# Patient Record
Sex: FEMALE | Race: WHITE | URBAN - METROPOLITAN AREA
[De-identification: names, ages, dates, MRNs, and addresses within clinical notes are randomized per-mention and may not be internally consistent; named-entity substitution may affect disease eponyms.]

---

## 2022-10-21 ENCOUNTER — OFFICE VISIT (OUTPATIENT)
Dept: ENT CLINIC | Age: 62
End: 2022-10-21
Payer: COMMERCIAL

## 2022-10-21 VITALS — BODY MASS INDEX: 30.21 KG/M2 | RESPIRATION RATE: 16 BRPM | HEIGHT: 61 IN | TEMPERATURE: 97.8 F | WEIGHT: 160 LBS

## 2022-10-21 DIAGNOSIS — E04.2 MULTINODULAR GOITER: Primary | ICD-10-CM

## 2022-10-21 PROCEDURE — 99204 OFFICE O/P NEW MOD 45 MIN: CPT | Performed by: OTOLARYNGOLOGY

## 2022-10-21 ASSESSMENT — ENCOUNTER SYMPTOMS
SHORTNESS OF BREATH: 0
SORE THROAT: 0
COUGH: 0
SINUS PRESSURE: 0
TROUBLE SWALLOWING: 0
VOICE CHANGE: 0
FACIAL SWELLING: 0
EYE ITCHING: 0
APNEA: 0

## 2022-10-21 NOTE — PROGRESS NOTES
[facility], [address], [address] [place] [address]  P: [phone]       Patient     Joellen Gosselin  1960    ChiefComplaint     Chief Complaint   Patient presents with    New Patient     Patient here today for thyroid, brought in disc with imaging       History of Present Illness     Ольга Palacio is a 68-year-old female here today for evaluation of right thyroid enlargement. States 20 years had a left thyroid goiter removed. Has not been on thyroid supplementation since then. Last several months noticed enlarging right neck fullness. Had ultrasound performed which demonstrated a right thyroid goiter with 3 large nodules. Mom has history of thyroid cancer, type unknown. No history of radiation exposure. Denies dysphagia, odynophagia    Past Medical History     Past Medical History:   Diagnosis Date    Nosebleed        Past Surgical History     Past Surgical History:   Procedure Laterality Date    TONSILLECTOMY         Family History     History reviewed. No pertinent family history. Social History     Social History     Tobacco Use    Smoking status: Every Day     Packs/day: 0.50     Years: 10.00     Pack years: 5.00     Types: Cigarettes    Smokeless tobacco: Never   Vaping Use    Vaping Use: Never used   Substance Use Topics    Alcohol use: Never    Drug use: Never        Allergies     Allergies   Allergen Reactions    Penicillins Other (See Comments)     States that she gets an awful infection       Medications     No current outpatient medications on file. No current facility-administered medications for this visit. Review of Systems     Review of Systems   Constitutional:  Negative for appetite change, chills, fatigue, fever and unexpected weight change. HENT:  Negative for congestion, ear discharge, ear pain, facial swelling, hearing loss, nosebleeds, postnasal drip, sinus pressure, sneezing, sore throat, tinnitus, trouble swallowing and voice change.     Eyes: Negative for itching. Respiratory:  Negative for apnea, cough and shortness of breath. Endocrine: Negative for cold intolerance and heat intolerance. Musculoskeletal:  Negative for myalgias and neck pain. Skin:  Negative for rash. Allergic/Immunologic: Negative for environmental allergies. Neurological:  Negative for dizziness and headaches. Psychiatric/Behavioral:  Negative for confusion, decreased concentration and sleep disturbance. PhysicalExam     Vitals:    10/21/22 1440   Resp: 16   Temp: 97.8 °F (36.6 °C)   TempSrc: Infrared   Weight: 160 lb (72.6 kg)   Height: 5' 1\" (1.549 m)       Physical Exam  Constitutional:       General: She is not in acute distress. Appearance: She is well-developed. HENT:      Head: Normocephalic and atraumatic. Right Ear: Tympanic membrane, ear canal and external ear normal. No drainage. No middle ear effusion. Tympanic membrane is not bulging. Tympanic membrane has normal mobility. Left Ear: Tympanic membrane, ear canal and external ear normal. No drainage. No middle ear effusion. Tympanic membrane is not bulging. Tympanic membrane has normal mobility. Nose: No mucosal edema or rhinorrhea. Mouth/Throat:      Lips: Pink. Mouth: Mucous membranes are moist.      Tongue: No lesions. Palate: No mass. Pharynx: Uvula midline. Eyes:      Pupils: Pupils are equal, round, and reactive to light. Neck:      Thyroid: Thyromegaly (right) present. No thyroid mass. Trachea: Trachea and phonation normal.   Cardiovascular:      Pulses: Normal pulses. Pulmonary:      Effort: Pulmonary effort is normal. No accessory muscle usage or respiratory distress. Breath sounds: No stridor. Musculoskeletal:      Cervical back: Full passive range of motion without pain. Lymphadenopathy:      Head:      Right side of head: No submental or submandibular adenopathy. Left side of head: No submental or submandibular adenopathy. Cervical: No cervical adenopathy. Right cervical: No superficial, deep or posterior cervical adenopathy. Left cervical: No superficial, deep or posterior cervical adenopathy. Skin:     General: Skin is warm and dry. Neurological:      Mental Status: She is alert and oriented to person, place, and time. Cranial Nerves: No cranial nerve deficit. Coordination: Coordination normal.      Gait: Gait normal.   Psychiatric:         Thought Content: Thought content normal.         Procedure     Flexible Laryngoscopy    Pre op: thyroid goiter  Post op: same  Procedure : Flexible Laryngoscopy  Surgeon: Alley Jarrell DO  Anesthesia: Afrin with 4% lidocaine  Indication: 75-year-old female with history of hemithyroidectomy need to evaluate vocal cord mobility laryngeal mirror examination was not tolerated due to gag reflex  Description:  The scope was passed along the floor of the right naris to the level of the larynx. The base of tongue, vallecula, epiglottis, aryepiglottic folds, arytenoids, false vocal folds, true vocal folds, or pyriform sinuses were all evaluated. True vocal folds exhibited symmetric motion bilaterally without evidence of paralysis or paresis. The scope was removed. The patient tolerated the procedure without difficulty. There were no complications. Pertinent findings: Normal vocal cord motion      Assessment and Plan     1. Multinodular goiter  -Ultrasound report reviewed-8.5 by 3.5 x 4 cm right thyroid lobe with 3 thyroid nodules greatest dimensions of 2.5 cm, 2.5 cm 3.5 cm  -2 of 3 nodules meet biopsy criteria  -Discussed ultrasound-guided biopsy versus thyroidectomy given size of the thyroid and need for frequent follow-up  -Wishes to proceed with right hemithyroidectomy. Risk specifically discussed for injury to recurrent laryngeal nerve resulting in dysphonia and dysphagia, transient hypocalcemia versus permanent with potential need for lifelong calcium supplementation. Understands she will be on lifetime thyroid medication  -Plan for thyroidectomy at her [facility],   10/21/22      Portions of this note were dictated using Dragon.  There may be linguistic errors secondary to the use of this program.

## 2022-10-21 NOTE — H&P (VIEW-ONLY)
[facility], [address], [address] [place] [address]  P: [phone]       Patient     Nilsa Brown  1960    ChiefComplaint     Chief Complaint   Patient presents with    New Patient     Patient here today for thyroid, brought in disc with imaging       History of Present Illness     Sukhwinder Bear is a 68-year-old female here today for evaluation of right thyroid enlargement. States 20 years had a left thyroid goiter removed. Has not been on thyroid supplementation since then. Last several months noticed enlarging right neck fullness. Had ultrasound performed which demonstrated a right thyroid goiter with 3 large nodules. Mom has history of thyroid cancer, type unknown. No history of radiation exposure. Denies dysphagia, odynophagia    Past Medical History     Past Medical History:   Diagnosis Date    Nosebleed        Past Surgical History     Past Surgical History:   Procedure Laterality Date    TONSILLECTOMY         Family History     History reviewed. No pertinent family history. Social History     Social History     Tobacco Use    Smoking status: Every Day     Packs/day: 0.50     Years: 10.00     Pack years: 5.00     Types: Cigarettes    Smokeless tobacco: Never   Vaping Use    Vaping Use: Never used   Substance Use Topics    Alcohol use: Never    Drug use: Never        Allergies     Allergies   Allergen Reactions    Penicillins Other (See Comments)     States that she gets an awful infection       Medications     No current outpatient medications on file. No current facility-administered medications for this visit. Review of Systems     Review of Systems   Constitutional:  Negative for appetite change, chills, fatigue, fever and unexpected weight change. HENT:  Negative for congestion, ear discharge, ear pain, facial swelling, hearing loss, nosebleeds, postnasal drip, sinus pressure, sneezing, sore throat, tinnitus, trouble swallowing and voice change.     Eyes: Negative for itching. Respiratory:  Negative for apnea, cough and shortness of breath. Endocrine: Negative for cold intolerance and heat intolerance. Musculoskeletal:  Negative for myalgias and neck pain. Skin:  Negative for rash. Allergic/Immunologic: Negative for environmental allergies. Neurological:  Negative for dizziness and headaches. Psychiatric/Behavioral:  Negative for confusion, decreased concentration and sleep disturbance. PhysicalExam     Vitals:    10/21/22 1440   Resp: 16   Temp: 97.8 °F (36.6 °C)   TempSrc: Infrared   Weight: 160 lb (72.6 kg)   Height: 5' 1\" (1.549 m)       Physical Exam  Constitutional:       General: She is not in acute distress. Appearance: She is well-developed. HENT:      Head: Normocephalic and atraumatic. Right Ear: Tympanic membrane, ear canal and external ear normal. No drainage. No middle ear effusion. Tympanic membrane is not bulging. Tympanic membrane has normal mobility. Left Ear: Tympanic membrane, ear canal and external ear normal. No drainage. No middle ear effusion. Tympanic membrane is not bulging. Tympanic membrane has normal mobility. Nose: No mucosal edema or rhinorrhea. Mouth/Throat:      Lips: Pink. Mouth: Mucous membranes are moist.      Tongue: No lesions. Palate: No mass. Pharynx: Uvula midline. Eyes:      Pupils: Pupils are equal, round, and reactive to light. Neck:      Thyroid: Thyromegaly (right) present. No thyroid mass. Trachea: Trachea and phonation normal.   Cardiovascular:      Pulses: Normal pulses. Pulmonary:      Effort: Pulmonary effort is normal. No accessory muscle usage or respiratory distress. Breath sounds: No stridor. Musculoskeletal:      Cervical back: Full passive range of motion without pain. Lymphadenopathy:      Head:      Right side of head: No submental or submandibular adenopathy. Left side of head: No submental or submandibular adenopathy. Cervical: No cervical adenopathy. Right cervical: No superficial, deep or posterior cervical adenopathy. Left cervical: No superficial, deep or posterior cervical adenopathy. Skin:     General: Skin is warm and dry. Neurological:      Mental Status: She is alert and oriented to person, place, and time. Cranial Nerves: No cranial nerve deficit. Coordination: Coordination normal.      Gait: Gait normal.   Psychiatric:         Thought Content: Thought content normal.         Procedure     Flexible Laryngoscopy    Pre op: thyroid goiter  Post op: same  Procedure : Flexible Laryngoscopy  Surgeon: Diony Terrazas DO  Anesthesia: Afrin with 4% lidocaine  Indication: 75-year-old female with history of hemithyroidectomy need to evaluate vocal cord mobility laryngeal mirror examination was not tolerated due to gag reflex  Description:  The scope was passed along the floor of the right naris to the level of the larynx. The base of tongue, vallecula, epiglottis, aryepiglottic folds, arytenoids, false vocal folds, true vocal folds, or pyriform sinuses were all evaluated. True vocal folds exhibited symmetric motion bilaterally without evidence of paralysis or paresis. The scope was removed. The patient tolerated the procedure without difficulty. There were no complications. Pertinent findings: Normal vocal cord motion      Assessment and Plan     1. Multinodular goiter  -Ultrasound report reviewed-8.5 by 3.5 x 4 cm right thyroid lobe with 3 thyroid nodules greatest dimensions of 2.5 cm, 2.5 cm 3.5 cm  -2 of 3 nodules meet biopsy criteria  -Discussed ultrasound-guided biopsy versus thyroidectomy given size of the thyroid and need for frequent follow-up  -Wishes to proceed with right hemithyroidectomy. Risk specifically discussed for injury to recurrent laryngeal nerve resulting in dysphonia and dysphagia, transient hypocalcemia versus permanent with potential need for lifelong calcium supplementation. Understands she will be on lifetime thyroid medication  -Plan for thyroidectomy at her [facility],   10/21/22      Portions of this note were dictated using Dragon.  There may be linguistic errors secondary to the use of this program.

## 2022-10-21 NOTE — LETTER
[facility]  Surgery Schedule Request Form: 10/21/22                                                                     1 wk post op TBS      DATE OF SURGERY: 11/10/2022    TIME OF SURGERY:  9:30 AM            CONF #: ____________________       Patient Information:    Patient name: Shayy Chamorro    YOB: 1960 Age/Sex:62 y.o./female    SS #:[id number]    Wt Readings from Last 1 Encounters:   10/21/22 160 lb (72.6 kg)       Telephone Information:   Mobile [phone]     Home [phone]     Surgeon & Procedure Information:     Lead surgeon: Genna Horowitz Co-Surgeon: jessica HOROWITZ  Phone: [phone] Fax: [phone]  PCP: No primary care provider on file. Diagnosis: right thyroid nodules  E04.2    Location: Any    Procedure name/CPT: Hemithyroidectomy 77835, right    Procedure length: 1.5 hours Anesthesia: General    Special Equipment: no    Patient Status: SDS (OP) , possible admit pending PTH  COVID Vacs: yes     Primary Payor Plan: [address] [place] [address]  Member ID: [id number]   [address] [place] [address] Drive name: Shayy Chamorro    [] Implement attached clinical orders for patient.       Electronically signed by Denisse Owusu DO on 10/21/2022 at 3:19 PM

## 2022-11-07 ENCOUNTER — TELEPHONE (OUTPATIENT)
Dept: ENT CLINIC | Age: 62
End: 2022-11-07

## 2022-11-07 NOTE — PROGRESS NOTES
PRE OP INSTRUCTION SHEET   1. Do not eat or drink anything after 12 midnight  prior to surgery. This includes no water, chewing gum or mints. 2. Take the following pills will a small sip of water (see MAR)                                        3. Aspirin, Ibuprofen, Advil, Naproxen, Vitamin E, fish oil and other Anti-inflammatory products should be stopped for 5 days before surgery or as directed by your physician. 4. Check with your Doctor regarding stopping Plavix, Coumadin, Lovenox, Fragmin or other blood thinners   5. Do not smoke, and do not drink any alcoholic beverages 24 hours prior to surgery. This includes NA Beer. 6. You may brush your teeth and gargle the morning of surgery. DO NOT SWALLOW WATER   7. You MUST make arrangements for a responsible adult to take you home after your surgery. You will not be allowed to leave alone or drive yourself home. It is strongly suggested someone stay with you the first 24 hrs. Your surgery will be cancelled if you do not have a ride home. 8. A parent/legal guardian must accompany a child scheduled for surgery and plan to stay at the hospital until the child is discharged. Please do not bring other children with you. 9. Please wear simple, loose fitting clothing to the hospital.  Mily Linares not bring valuables (money, credit cards, checkbooks, etc.) Do not wear any makeup (including no eye makeup) or nail polish on your fingers or toes. 10. DO NOT wear any jewelry or piercings on day of surgery. All body piercing jewelry must be removed. 11. If you have dentures,glasses, or contacts they will be removed before going to the OR; we will provide you a container. 12. Please see your family doctor/and cardiologist for a history & physical and/or concerning medications. Bring any test results/reports from your physician's office. Have history and labs faxed to [phone].  Remember to bring Blood Bank bracelet on the day of surgery. 14. If you have a Living Will and Durable Power of  for Healthcare, please bring in a copy. 13. Notify your Surgeon if you develop any illness between now and surgery  time, cough, cold, fever, sore throat, nausea, vomiting, etc.  Please notify your surgeon if you experience dizziness, shortness of breath or blurred vision between now & the time of your surgery   16. DO NOT shave your operative site 96 hours prior to surgery. For face & neck surgery, men may use an electric razor 48 hours prior to surgery. 17. Shower with _x__Antibacterial soap (x_chlorhexidine for total joint  Pt's) shower two times before surgery.(the morning of and the night before. 18. To provide excellent care visitors will be limited to one in the room at any given time.   Please call pre admission testing if you any further questions [phone] or [phone]

## 2022-11-07 NOTE — TELEPHONE ENCOUNTER
Patient called us to confirm her surgery on 11/10/22. Advised her of time change. She expressed understanding.

## 2022-11-08 ENCOUNTER — ANESTHESIA EVENT (OUTPATIENT)
Dept: OPERATING ROOM | Age: 62
End: 2022-11-08
Payer: COMMERCIAL

## 2022-11-08 ENCOUNTER — TELEPHONE (OUTPATIENT)
Dept: ENT CLINIC | Age: 62
End: 2022-11-08

## 2022-11-08 ENCOUNTER — PREP FOR PROCEDURE (OUTPATIENT)
Dept: ENT CLINIC | Age: 62
End: 2022-11-08

## 2022-11-08 RX ORDER — SODIUM CHLORIDE 0.9 % (FLUSH) 0.9 %
5-40 SYRINGE (ML) INJECTION PRN
Status: CANCELLED | OUTPATIENT
Start: 2022-11-08

## 2022-11-08 RX ORDER — SODIUM CHLORIDE 0.9 % (FLUSH) 0.9 %
5-40 SYRINGE (ML) INJECTION EVERY 12 HOURS SCHEDULED
Status: CANCELLED | OUTPATIENT
Start: 2022-11-08

## 2022-11-08 RX ORDER — SODIUM CHLORIDE 9 MG/ML
INJECTION, SOLUTION INTRAVENOUS PRN
Status: CANCELLED | OUTPATIENT
Start: 2022-11-08

## 2022-11-09 ENCOUNTER — TELEPHONE (OUTPATIENT)
Dept: ENT CLINIC | Age: 62
End: 2022-11-09

## 2022-11-09 NOTE — TELEPHONE ENCOUNTER
Call received from Carlos with PAT in [place]. She was reviewing the patient for Dr. Dinora Whittaker schedule for tomorrow, she was confused as the surgery note says possible admit depending on PTH. Carlos stated there was no order placed in Epic and she was unsure when the blood work needed to be drawn and ran. I spoke with Dr. Verona Faust who stated the blood work will be drawn 10 minutes after she removes the thyroid in the OR, nothing needs to happen prior to the surgery. I called Carlos back and informed her of this, and stated an order does not need to be placed, Carlos informed me she does place those orders but she will put a note on the order stating it needs to be collected 10 minutes after the thyroid is removed.

## 2022-11-10 ENCOUNTER — ANESTHESIA (OUTPATIENT)
Dept: OPERATING ROOM | Age: 62
End: 2022-11-10
Payer: COMMERCIAL

## 2022-11-10 ENCOUNTER — HOSPITAL ENCOUNTER (OUTPATIENT)
Age: 62
Setting detail: OUTPATIENT SURGERY
Discharge: HOME | End: 2022-11-10
Attending: OTOLARYNGOLOGY | Admitting: OTOLARYNGOLOGY
Payer: COMMERCIAL

## 2022-11-10 VITALS
HEIGHT: 61 IN | HEART RATE: 82 BPM | DIASTOLIC BLOOD PRESSURE: 83 MMHG | OXYGEN SATURATION: 95 % | BODY MASS INDEX: 30.21 KG/M2 | RESPIRATION RATE: 17 BRPM | TEMPERATURE: 97.6 F | WEIGHT: 160 LBS | SYSTOLIC BLOOD PRESSURE: 179 MMHG

## 2022-11-10 DIAGNOSIS — E04.2 MULTIPLE THYROID NODULES: ICD-10-CM

## 2022-11-10 DIAGNOSIS — G89.18 POST-OP PAIN: Primary | ICD-10-CM

## 2022-11-10 LAB
ABO/RH: NORMAL
ANION GAP SERPL CALCULATED.3IONS-SCNC: 12 MMOL/L (ref 3–16)
ANTIBODY SCREEN: NORMAL
BUN BLDV-MCNC: 15 MG/DL (ref 7–20)
CALCIUM SERPL-MCNC: 9 MG/DL (ref 8.3–10.6)
CHLORIDE BLD-SCNC: 101 MMOL/L (ref 99–110)
CO2: 23 MMOL/L (ref 21–32)
CREAT SERPL-MCNC: <0.5 MG/DL (ref 0.6–1.2)
EKG ATRIAL RATE: 82 BPM
EKG DIAGNOSIS: NORMAL
EKG P AXIS: 55 DEGREES
EKG P-R INTERVAL: 156 MS
EKG Q-T INTERVAL: 382 MS
EKG QRS DURATION: 90 MS
EKG QTC CALCULATION (BAZETT): 446 MS
EKG R AXIS: -24 DEGREES
EKG T AXIS: 75 DEGREES
EKG VENTRICULAR RATE: 82 BPM
GFR SERPL CREATININE-BSD FRML MDRD: >60 ML/MIN/{1.73_M2}
GLUCOSE BLD-MCNC: 220 MG/DL (ref 70–99)
HCT VFR BLD CALC: 40.6 % (ref 36–48)
HEMOGLOBIN: 13.9 G/DL (ref 12–16)
MCH RBC QN AUTO: 30.3 PG (ref 26–34)
MCHC RBC AUTO-ENTMCNC: 34.4 G/DL (ref 31–36)
MCV RBC AUTO: 88.2 FL (ref 80–100)
PARATHYROID HORMONE INTACT: 55.3 PG/ML (ref 14–72)
PARATHYROID HORMONE INTACT: 65.8 PG/ML (ref 14–72)
PDW BLD-RTO: 12.3 % (ref 12.4–15.4)
PLATELET # BLD: 266 K/UL (ref 135–450)
PMV BLD AUTO: 8 FL (ref 5–10.5)
POTASSIUM REFLEX MAGNESIUM: 4.2 MMOL/L (ref 3.5–5.1)
RBC # BLD: 4.6 M/UL (ref 4–5.2)
SODIUM BLD-SCNC: 136 MMOL/L (ref 136–145)
WBC # BLD: 9.8 K/UL (ref 4–11)

## 2022-11-10 PROCEDURE — 86900 BLOOD TYPING SEROLOGIC ABO: CPT

## 2022-11-10 PROCEDURE — 80048 BASIC METABOLIC PNL TOTAL CA: CPT

## 2022-11-10 PROCEDURE — 88307 TISSUE EXAM BY PATHOLOGIST: CPT

## 2022-11-10 PROCEDURE — 2500000003 HC RX 250 WO HCPCS: Performed by: NURSE ANESTHETIST, CERTIFIED REGISTERED

## 2022-11-10 PROCEDURE — 6360000002 HC RX W HCPCS: Performed by: NURSE ANESTHETIST, CERTIFIED REGISTERED

## 2022-11-10 PROCEDURE — 6370000000 HC RX 637 (ALT 250 FOR IP): Performed by: ANESTHESIOLOGY

## 2022-11-10 PROCEDURE — 2580000003 HC RX 258: Performed by: NURSE ANESTHETIST, CERTIFIED REGISTERED

## 2022-11-10 PROCEDURE — 86850 RBC ANTIBODY SCREEN: CPT

## 2022-11-10 PROCEDURE — 36415 COLL VENOUS BLD VENIPUNCTURE: CPT

## 2022-11-10 PROCEDURE — 2580000003 HC RX 258: Performed by: ANESTHESIOLOGY

## 2022-11-10 PROCEDURE — 85027 COMPLETE CBC AUTOMATED: CPT

## 2022-11-10 PROCEDURE — 3600000015 HC SURGERY LEVEL 5 ADDTL 15MIN: Performed by: OTOLARYNGOLOGY

## 2022-11-10 PROCEDURE — 2720000010 HC SURG SUPPLY STERILE: Performed by: OTOLARYNGOLOGY

## 2022-11-10 PROCEDURE — 93005 ELECTROCARDIOGRAM TRACING: CPT | Performed by: ANESTHESIOLOGY

## 2022-11-10 PROCEDURE — 3700000001 HC ADD 15 MINUTES (ANESTHESIA): Performed by: OTOLARYNGOLOGY

## 2022-11-10 PROCEDURE — 7100000011 HC PHASE II RECOVERY - ADDTL 15 MIN: Performed by: OTOLARYNGOLOGY

## 2022-11-10 PROCEDURE — 7100000001 HC PACU RECOVERY - ADDTL 15 MIN: Performed by: OTOLARYNGOLOGY

## 2022-11-10 PROCEDURE — 2709999900 HC NON-CHARGEABLE SUPPLY: Performed by: OTOLARYNGOLOGY

## 2022-11-10 PROCEDURE — 3700000000 HC ANESTHESIA ATTENDED CARE: Performed by: OTOLARYNGOLOGY

## 2022-11-10 PROCEDURE — 2500000003 HC RX 250 WO HCPCS: Performed by: ANESTHESIOLOGY

## 2022-11-10 PROCEDURE — 3600000005 HC SURGERY LEVEL 5 BASE: Performed by: OTOLARYNGOLOGY

## 2022-11-10 PROCEDURE — 7100000000 HC PACU RECOVERY - FIRST 15 MIN: Performed by: OTOLARYNGOLOGY

## 2022-11-10 PROCEDURE — 60210 PARTIAL THYROID EXCISION: CPT | Performed by: OTOLARYNGOLOGY

## 2022-11-10 PROCEDURE — 86901 BLOOD TYPING SEROLOGIC RH(D): CPT

## 2022-11-10 PROCEDURE — 7100000010 HC PHASE II RECOVERY - FIRST 15 MIN: Performed by: OTOLARYNGOLOGY

## 2022-11-10 PROCEDURE — 83970 ASSAY OF PARATHORMONE: CPT

## 2022-11-10 RX ORDER — LIDOCAINE HYDROCHLORIDE 20 MG/ML
INJECTION, SOLUTION EPIDURAL; INFILTRATION; INTRACAUDAL; PERINEURAL PRN
Status: DISCONTINUED | OUTPATIENT
Start: 2022-11-10 | End: 2022-11-10 | Stop reason: SDUPTHER

## 2022-11-10 RX ORDER — BUPIVACAINE HYDROCHLORIDE AND EPINEPHRINE 5; 5 MG/ML; UG/ML
INJECTION, SOLUTION PERINEURAL PRN
Status: DISCONTINUED | OUTPATIENT
Start: 2022-11-10 | End: 2022-11-10 | Stop reason: ALTCHOICE

## 2022-11-10 RX ORDER — SODIUM CHLORIDE 0.9 % (FLUSH) 0.9 %
5-40 SYRINGE (ML) INJECTION EVERY 12 HOURS SCHEDULED
Status: DISCONTINUED | OUTPATIENT
Start: 2022-11-10 | End: 2022-11-10 | Stop reason: SDUPTHER

## 2022-11-10 RX ORDER — SODIUM CHLORIDE 0.9 % (FLUSH) 0.9 %
5-40 SYRINGE (ML) INJECTION PRN
Status: DISCONTINUED | OUTPATIENT
Start: 2022-11-10 | End: 2022-11-10 | Stop reason: SDUPTHER

## 2022-11-10 RX ORDER — SODIUM CHLORIDE, SODIUM LACTATE, POTASSIUM CHLORIDE, CALCIUM CHLORIDE 600; 310; 30; 20 MG/100ML; MG/100ML; MG/100ML; MG/100ML
INJECTION, SOLUTION INTRAVENOUS CONTINUOUS
Status: DISCONTINUED | OUTPATIENT
Start: 2022-11-10 | End: 2022-11-10 | Stop reason: HOSPADM

## 2022-11-10 RX ORDER — FAMOTIDINE 10 MG/ML
20 INJECTION, SOLUTION INTRAVENOUS ONCE
Status: COMPLETED | OUTPATIENT
Start: 2022-11-10 | End: 2022-11-10

## 2022-11-10 RX ORDER — SODIUM CHLORIDE 9 MG/ML
INJECTION, SOLUTION INTRAVENOUS PRN
Status: DISCONTINUED | OUTPATIENT
Start: 2022-11-10 | End: 2022-11-10 | Stop reason: SDUPTHER

## 2022-11-10 RX ORDER — ONDANSETRON 2 MG/ML
INJECTION INTRAMUSCULAR; INTRAVENOUS PRN
Status: DISCONTINUED | OUTPATIENT
Start: 2022-11-10 | End: 2022-11-10 | Stop reason: SDUPTHER

## 2022-11-10 RX ORDER — KETAMINE HYDROCHLORIDE 100 MG/ML
INJECTION, SOLUTION INTRAMUSCULAR; INTRAVENOUS PRN
Status: DISCONTINUED | OUTPATIENT
Start: 2022-11-10 | End: 2022-11-10 | Stop reason: SDUPTHER

## 2022-11-10 RX ORDER — SODIUM CHLORIDE 0.9 % (FLUSH) 0.9 %
5-40 SYRINGE (ML) INJECTION PRN
Status: DISCONTINUED | OUTPATIENT
Start: 2022-11-10 | End: 2022-11-10 | Stop reason: HOSPADM

## 2022-11-10 RX ORDER — SODIUM CHLORIDE 0.9 % (FLUSH) 0.9 %
5-40 SYRINGE (ML) INJECTION EVERY 12 HOURS SCHEDULED
Status: DISCONTINUED | OUTPATIENT
Start: 2022-11-10 | End: 2022-11-10 | Stop reason: HOSPADM

## 2022-11-10 RX ORDER — ONDANSETRON 2 MG/ML
4 INJECTION INTRAMUSCULAR; INTRAVENOUS EVERY 10 MIN PRN
Status: DISCONTINUED | OUTPATIENT
Start: 2022-11-10 | End: 2022-11-10 | Stop reason: HOSPADM

## 2022-11-10 RX ORDER — DEXAMETHASONE SODIUM PHOSPHATE 4 MG/ML
INJECTION, SOLUTION INTRA-ARTICULAR; INTRALESIONAL; INTRAMUSCULAR; INTRAVENOUS; SOFT TISSUE PRN
Status: DISCONTINUED | OUTPATIENT
Start: 2022-11-10 | End: 2022-11-10 | Stop reason: SDUPTHER

## 2022-11-10 RX ORDER — HYDRALAZINE HYDROCHLORIDE 20 MG/ML
5 INJECTION INTRAMUSCULAR; INTRAVENOUS
Status: DISCONTINUED | OUTPATIENT
Start: 2022-11-10 | End: 2022-11-10 | Stop reason: HOSPADM

## 2022-11-10 RX ORDER — FENTANYL CITRATE 50 UG/ML
INJECTION, SOLUTION INTRAMUSCULAR; INTRAVENOUS PRN
Status: DISCONTINUED | OUTPATIENT
Start: 2022-11-10 | End: 2022-11-10 | Stop reason: SDUPTHER

## 2022-11-10 RX ORDER — OXYCODONE HYDROCHLORIDE 5 MG/1
10 TABLET ORAL PRN
Status: COMPLETED | OUTPATIENT
Start: 2022-11-10 | End: 2022-11-10

## 2022-11-10 RX ORDER — SODIUM CHLORIDE 9 MG/ML
INJECTION, SOLUTION INTRAVENOUS PRN
Status: DISCONTINUED | OUTPATIENT
Start: 2022-11-10 | End: 2022-11-10 | Stop reason: HOSPADM

## 2022-11-10 RX ORDER — SODIUM CHLORIDE 9 MG/ML
25 INJECTION, SOLUTION INTRAVENOUS PRN
Status: DISCONTINUED | OUTPATIENT
Start: 2022-11-10 | End: 2022-11-10 | Stop reason: SDUPTHER

## 2022-11-10 RX ORDER — OXYCODONE HYDROCHLORIDE 5 MG/1
5 TABLET ORAL PRN
Status: COMPLETED | OUTPATIENT
Start: 2022-11-10 | End: 2022-11-10

## 2022-11-10 RX ORDER — ROCURONIUM BROMIDE 10 MG/ML
INJECTION, SOLUTION INTRAVENOUS PRN
Status: DISCONTINUED | OUTPATIENT
Start: 2022-11-10 | End: 2022-11-10 | Stop reason: SDUPTHER

## 2022-11-10 RX ORDER — PROPOFOL 10 MG/ML
INJECTION, EMULSION INTRAVENOUS PRN
Status: DISCONTINUED | OUTPATIENT
Start: 2022-11-10 | End: 2022-11-10 | Stop reason: SDUPTHER

## 2022-11-10 RX ORDER — HYDROCODONE BITARTRATE AND ACETAMINOPHEN 5; 325 MG/1; MG/1
1 TABLET ORAL EVERY 6 HOURS PRN
Qty: 10 TABLET | Refills: 0 | Status: SHIPPED | OUTPATIENT
Start: 2022-11-10 | End: 2022-11-13

## 2022-11-10 RX ORDER — MEPERIDINE HYDROCHLORIDE 25 MG/ML
12.5 INJECTION INTRAMUSCULAR; INTRAVENOUS; SUBCUTANEOUS EVERY 5 MIN PRN
Status: DISCONTINUED | OUTPATIENT
Start: 2022-11-10 | End: 2022-11-10 | Stop reason: HOSPADM

## 2022-11-10 RX ORDER — DIPHENHYDRAMINE HYDROCHLORIDE 50 MG/ML
12.5 INJECTION INTRAMUSCULAR; INTRAVENOUS
Status: DISCONTINUED | OUTPATIENT
Start: 2022-11-10 | End: 2022-11-10 | Stop reason: HOSPADM

## 2022-11-10 RX ORDER — SODIUM CHLORIDE, SODIUM LACTATE, POTASSIUM CHLORIDE, CALCIUM CHLORIDE 600; 310; 30; 20 MG/100ML; MG/100ML; MG/100ML; MG/100ML
INJECTION, SOLUTION INTRAVENOUS CONTINUOUS PRN
Status: DISCONTINUED | OUTPATIENT
Start: 2022-11-10 | End: 2022-11-10 | Stop reason: SDUPTHER

## 2022-11-10 RX ORDER — MIDAZOLAM HYDROCHLORIDE 1 MG/ML
1 INJECTION INTRAMUSCULAR; INTRAVENOUS EVERY 5 MIN PRN
Status: DISCONTINUED | OUTPATIENT
Start: 2022-11-10 | End: 2022-11-10 | Stop reason: HOSPADM

## 2022-11-10 RX ORDER — LEVOTHYROXINE SODIUM 0.07 MG/1
75 TABLET ORAL DAILY
Qty: 30 TABLET | Refills: 5 | Status: SHIPPED | OUTPATIENT
Start: 2022-11-10

## 2022-11-10 RX ORDER — GLYCOPYRROLATE 0.2 MG/ML
INJECTION INTRAMUSCULAR; INTRAVENOUS PRN
Status: DISCONTINUED | OUTPATIENT
Start: 2022-11-10 | End: 2022-11-10 | Stop reason: SDUPTHER

## 2022-11-10 RX ADMIN — ROCURONIUM BROMIDE 40 MG: 10 INJECTION, SOLUTION INTRAVENOUS at 07:34

## 2022-11-10 RX ADMIN — SUGAMMADEX 200 MG: 100 INJECTION, SOLUTION INTRAVENOUS at 07:46

## 2022-11-10 RX ADMIN — GLYCOPYRROLATE 0.2 MG: 0.2 INJECTION, SOLUTION INTRAMUSCULAR; INTRAVENOUS at 07:34

## 2022-11-10 RX ADMIN — SODIUM CHLORIDE, POTASSIUM CHLORIDE, SODIUM LACTATE AND CALCIUM CHLORIDE: 600; 310; 30; 20 INJECTION, SOLUTION INTRAVENOUS at 07:30

## 2022-11-10 RX ADMIN — DEXAMETHASONE SODIUM PHOSPHATE 8 MG: 4 INJECTION, SOLUTION INTRAMUSCULAR; INTRAVENOUS at 07:34

## 2022-11-10 RX ADMIN — ROCURONIUM BROMIDE 10 MG: 10 INJECTION, SOLUTION INTRAVENOUS at 07:38

## 2022-11-10 RX ADMIN — KETAMINE HYDROCHLORIDE 40 MG: 100 INJECTION, SOLUTION, CONCENTRATE INTRAMUSCULAR; INTRAVENOUS at 07:34

## 2022-11-10 RX ADMIN — PHENYLEPHRINE HYDROCHLORIDE 100 MCG: 10 INJECTION INTRAVENOUS at 08:13

## 2022-11-10 RX ADMIN — SODIUM CHLORIDE, POTASSIUM CHLORIDE, SODIUM LACTATE AND CALCIUM CHLORIDE: 600; 310; 30; 20 INJECTION, SOLUTION INTRAVENOUS at 07:03

## 2022-11-10 RX ADMIN — LIDOCAINE HYDROCHLORIDE 100 MG: 20 INJECTION, SOLUTION EPIDURAL; INFILTRATION; INTRACAUDAL; PERINEURAL at 07:34

## 2022-11-10 RX ADMIN — FENTANYL CITRATE 100 MCG: 50 INJECTION INTRAMUSCULAR; INTRAVENOUS at 08:56

## 2022-11-10 RX ADMIN — PHENYLEPHRINE HYDROCHLORIDE 100 MCG: 10 INJECTION INTRAVENOUS at 08:01

## 2022-11-10 RX ADMIN — PHENYLEPHRINE HYDROCHLORIDE 100 MCG: 10 INJECTION INTRAVENOUS at 08:20

## 2022-11-10 RX ADMIN — PROPOFOL 50 MG: 10 INJECTION, EMULSION INTRAVENOUS at 08:11

## 2022-11-10 RX ADMIN — ONDANSETRON HYDROCHLORIDE 4 MG: 2 INJECTION, SOLUTION INTRAMUSCULAR; INTRAVENOUS at 07:34

## 2022-11-10 RX ADMIN — PHENYLEPHRINE HYDROCHLORIDE 100 MCG: 10 INJECTION INTRAVENOUS at 08:42

## 2022-11-10 RX ADMIN — FAMOTIDINE 20 MG: 10 INJECTION INTRAVENOUS at 07:09

## 2022-11-10 RX ADMIN — PROPOFOL 200 MG: 10 INJECTION, EMULSION INTRAVENOUS at 07:34

## 2022-11-10 RX ADMIN — OXYCODONE 5 MG: 5 TABLET ORAL at 11:24

## 2022-11-10 RX ADMIN — SODIUM CHLORIDE, POTASSIUM CHLORIDE, SODIUM LACTATE AND CALCIUM CHLORIDE: 600; 310; 30; 20 INJECTION, SOLUTION INTRAVENOUS at 08:57

## 2022-11-10 ASSESSMENT — PAIN DESCRIPTION - DESCRIPTORS: DESCRIPTORS: ACHING

## 2022-11-10 ASSESSMENT — LIFESTYLE VARIABLES: SMOKING_STATUS: 1

## 2022-11-10 ASSESSMENT — PAIN DESCRIPTION - LOCATION: LOCATION: THROAT

## 2022-11-10 ASSESSMENT — PAIN - FUNCTIONAL ASSESSMENT: PAIN_FUNCTIONAL_ASSESSMENT: 0-10

## 2022-11-10 ASSESSMENT — PAIN SCALES - GENERAL: PAINLEVEL_OUTOF10: 7

## 2022-11-10 NOTE — ANESTHESIA POSTPROCEDURE EVALUATION
Department of Anesthesiology  Postprocedure Note    Patient: Sheridan Romero  MRN: [id number]  YOB: 1960  Date of evaluation: 11/10/2022      Procedure Summary     Date: 11/10/22 Room / Location: [facility]    Anesthesia Start: 0730 Anesthesia Stop: 1010    Procedure: HEMITHYROIDECTOMY (Right: Neck) Diagnosis:       Multiple thyroid nodules      (Multiple thyroid nodules [E04.2])    Surgeons: Frances Peguero DO Responsible Provider: Paulette Spear MD    Anesthesia Type: general ASA Status: 2          Anesthesia Type: No value filed.     Dorota Phase I: Dorota Score: 9    Dorota Phase II: Dorota Score: 10      Anesthesia Post Evaluation    Patient location during evaluation: PACU  Level of consciousness: awake  Airway patency: patent  Nausea & Vomiting: no nausea  Complications: no  Cardiovascular status: blood pressure returned to baseline  Respiratory status: acceptable  Hydration status: euvolemic

## 2022-11-10 NOTE — OP NOTE
"In-Patient Diabetes/Hyperglycemia Management Consult    Chief Complaint:  \"Glycemic management recommendations\"  Consult requested by: Kerrie Campo MD    All information gathered via chart review and face-to-face interview and assessment - Son and Monica both participating.    History of Present Illness:  Jimmy Otto is a 69 year old male with a PMH including but not limited to HTN, uncontrolled DM 2 (HbA1c 11.5), HLD, BPH, and history of a stroke in 2012 with residual right hemiparesis who presented to the emergency room on 9/14/2021 with altered mental status and acute worsening of right-sided weakness.  Was not a candidate for thrombolysis due to unknown time of last known normal, and initial CT and CT imaging did not show any acute abnormalities or large vessel occlusion therefore not a candidate for mechanical thrombectomy.  An MRI was then obtained which did demonstrate an acute right thalamic CVA.  Etiology felt to be due to small vessel disease and he has been initiated on aspirin and Plavix for 1 month, followed by aspirin alone indefinitely with a check of P2Y12 activity after a month of being on Plavix only.  Hospital course has been complicated by acute kidney injury with a peak creatinine of 2.13, hypokalemia needing repletion, and urinary retention and hematuria needing Felix catheter and urology consult.      Diabetes:  Mr. Jimmy Otto has a diagnosis of T2DM, largely uncontrolled with worsening control ->Hemoglobin A1c is 9.4, up a little from 9.1 in April.   Uses a FreeStyle Sherwin CGM - tendenacy for lows over the night time - has run out of sensors - causing barriers to continuous monitoring and benefit of CGM.  He does not have his CGM here and son was wondering about bringing. Reviewed policy of finger sticks for treatment decisions but he is welcome to bring it in or wait until closer to discharge.     + Family Hx - mother, his 2 monica both with gestational diabetes    At time of consult:  Lantus 25 " [address] [facility] SURGERY  OPERATIVE REPORT    Patient Name: Sheridan Romero  YOB: 1960  Medical Record Number:  [id number]  Billing Number:  [id number]  Date of Procedure: 11/10/22  Time: 0730    Pre Operative Diagnoses:   Problem List Items Addressed This Visit          Endocrine    Multiple thyroid nodules    Relevant Medications    levothyroxine (SYNTHROID) 75 MCG tablet    Other Relevant Orders    Surgical Pathology     Other Visit Diagnoses       Post-op pain    -  Primary    Relevant Medications    HYDROcodone-acetaminophen (Yesika Motta) 5-325 MG per tablet          Post Operative Diagnoses:    Problem List Items Addressed This Visit          Endocrine    Multiple thyroid nodules    Relevant Medications    levothyroxine (SYNTHROID) 75 MCG tablet    Other Relevant Orders    Surgical Pathology     Other Visit Diagnoses       Post-op pain    -  Primary    Relevant Medications    HYDROcodone-acetaminophen (NORCO) 5-325 MG per tablet                   Procedure:  1. Right hemithyroidectomy          2. Recurrent laryngeal nerve integrity monitoring        Surgeon: Frances Peguero DO    OR Staff/ Assistant:  Circulator: Merrick Rivas RN; Roberto Carvalho RN  Surgical Assistant: Anastasia Saldaña  Scrub Person First: Perfecto Ballesteros    Anesthesia:  General anesthesia. Findings:  1) right thyroid goiter 2) right recurrent laryngeal nerve stimulated at end of case    Indications: This is a 57 y.o. female with right thyroid goiter. Risks and benefits discussed with the patient including alternate treatment options, Informed consent was obtained, the patient elected to proceed with the planned procedure. DETAILS OF PROCEDURE(S):       The patient was brought to the operating room, placed in a supine position, and induced and intubated per anesthesia. The patient was intubated with a  Xomed nerve integrity monitoring endotracheal tube.  Direct laryngoscopy confirmed accurate placement "unit(s)   Novolog 2:15, no snack covearge  Novolog medium resistance sliding scale insulin     Recent BG patterns :      Labs:   - 92 this AM  Na 141  K 3.0  AG - WNL  Creat 1.37/GFR 52  hgb 13.2  Covid -19 - negative    PTA, Family reports Ho has been \"sefl-sufficient\" at home and family minimally assists, rigo thinks maybe he reduces or adjusts his Levemir in the evening 2/2 fear of having too low of Bgs.  He does not like to be much less than 200 - they have been working with PCP on slowly titrating down so he can better tolerate an in target BG.  She knows from his A1c that BG control is sub optimal.      Patient and family share clinic notes and what they help with at home for his regimen    Ho is unable to verbally contribute to the consult, his son and rigo are able to interpret motions, nods and will ask some questions in their language which he resonds to  - he does nod head yes/no to ROS questions, denies pain  RN shared the elevated BG from today/lunch are 2/2 post prandial reads.     Non-smoker, never smoked  Denies etoh/drugs    Confounding factors: No steroids/no dextrose fluids    ELOS:  TBD    Diabetes:  Recent Labs   Lab 09/18/21  0728 09/18/21  0625 09/18/21  0300 09/18/21  0230 09/17/21  2121 09/17/21  1718   GLC 92 94 101* 83 256* 187*     Diabetes Type:   Type 2 Diabetes  Diabetes Duration: No steroids, no dextrose    Usual Diabetes Regimen:     Medications:     Levemir 28 units at bedtime  \"Novolog - varied doses based on chos\"  Clinic notes reveal:  Novolog 2 unit(s) per carb choice (2per 15)  Novolog 1 unit(s) per 50> 150    Metformin 500 mg BID (dose reduced for decreased renal fxn)    BG monitoring frequency:  FreeStyle Sherwin - intermittent use    Diet: regular, no restrictions  Eats sporadically - AM - bagel or banana  Lunch - sporadic  Dinner - rice/beg/meat  Loves to drink [employer]    Exercise: sedentary    Ability to Protem Prescribed Regimen: TBD - s/p stroke with R sided " of the EMG contact electrodes to the vocalis muscles of the endolarynx bilaterally. Subdermal ground electrodes were placed and all electrodes hooked up to the nerve monitor, which was turned on and set for continuous laryngeal nerve monitoring for the remainder of the  procedure. Only a short-acting muscle relaxant was used for intubation, no further muscle relaxant given, and no topical laryngeal anesthetic was used. After confirming the correct patient, procedure, and site using standard timeout technique, the neck was prepped and draped in standard sterile fashion. A horizontalskin incision was made through a previously identified skin crease. Dissection was carried down to the subplatysmal plane. The strap muscles were  in the midline and retracted laterally on the right. The thyroid gland was mobilized medially. Attention was turned to the superior pole, with identification and preservation of the superior laryngeal nerve and cricothyroid muscle medially. The integrity of the nerve was confirmed with the nerve stimulator and monitor. The inferior and superior parathyroid glands were preserved along with their blood supply. The recurrent nerve was followed to its insertion near the cricothyroid membrane, Rivera's ligament was sharply transected. The isthmus was elevated off the face of the trachea and the thyroid gland was passed off the field and sent for final histologic diagnosis. Hemostasis was obtained. Surgi snow was placed in the surgical bed. The sponge count was correct. The wound was closed in layers. The patient was returned to anesthesia care, awakened, extubated, and taken to the recovery room in good condition. I attest that I was present for and did the entire procedure myself.     Estimated Blood Loss: less than 100 ml                 Specimens:   ID Type Source Tests Collected by Time Destination   A : RIGHT THYROID Tissue Tissue SURGICAL PATHOLOGY Denisse Owusu DO 11/10/2022 4026               Drains: None     Complications: There were no complications.     Aníbal Earl DO hemiparesis, expressive aphasia    Diabetes Control:   Lab Results   Component Value Date    A1C 11.5 09/15/2021    A1C 7.6 08/14/2012     Diabetes Complications: no retinopathy, last dilated eye exam up-to-date, no peripheral neuropathy, +nephropathy/+ microalbuminuria  On:  ASA  Atorvastatin  Losartan  hydrochlorothiazide  Amlodipine  Atenolol  spironolactone    History of DKA:   No  Able to Detect Hypoglycemia: yes - nervous, shaky, weak, uncomfortable    Usual Diabetes Care Provider: Nallely Fong MD.  [facility].    Factors Impacting Glucose Control: acute illness, diet/eating changes, changes in activity level     Review of Systems:    CC:  denies  Constitutional:   No fever, no chills  ENT/Mouth:   No hearing changes, no ear pain, no sore throat, no rhinorrhea, no difficulty swallowing  Eyes:  No eye pain, no discharge, no vision changes  CV:   No CP/SOB, no new edema  Resp:  No cough, no wheezing  GI:   No nausea, no vomiting, no constipation, no diarrhea  :  No dysuria, no frequency, no hematuria - cath  Musk:  No joint swelling/pain, No back pain  Skin/heme:   No new rashes/bruises/open areas.  No pruritis  Neuro:   No new weakness, denies numbness/tingling, no headache  Psych:   No new anxiety,  depression  Endocrine:  No polyuria, no polydipsia    Past medical, family and social histories are reviewed and updated.    Past Medical History  Past Medical History:   Diagnosis Date     Diabetes mellitus (H)      Hypercholesteremia      Hypertension      Unspecified cerebral artery occlusion with cerebral infarction     TIA 8/11       Family History  No family history on file.    Social History  Social History     Socioeconomic History     Marital status:      Spouse name: Not on file     Number of children: Not on file     Years of education: Not on file     Highest education level: Not on file   Occupational History     Not on file   Tobacco Use     Smoking status: Never Smoker   Substance and Sexual  "Activity     Alcohol use: Yes     Comment: occassional     Drug use: No     Sexual activity: Not on file   Other Topics Concern     Not on file   Social History Narrative     Not on file     Social Determinants of Health     Financial Resource Strain:      Difficulty of Paying Living Expenses:    Food Insecurity:      Worried About Running Out of Food in the Last Year:      Ran Out of Food in the Last Year:    Transportation Needs:      Lack of Transportation (Medical):      Lack of Transportation (Non-Medical):    Physical Activity:      Days of Exercise per Week:      Minutes of Exercise per Session:    Stress:      Feeling of Stress :    Social Connections:      Frequency of Communication with Friends and Family:      Frequency of Social Gatherings with Friends and Family:      Attends Je[employer] witness Services:      Active Member of Clubs or Organizations:      Attends Club or Organization Meetings:      Marital Status:    Intimate Partner Violence:      Fear of Current or Ex-Partner:      Emotionally Abused:      Physically Abused:      Sexually Abused:      Physical Exam:  BP (!) 144/81 (BP Location: Right arm)   Pulse 83   Temp 98.6  F (37  C) (Oral)   Resp 16   Ht 1.626 m (5' 4\")   Wt 66.2 kg (145 lb 15.1 oz)   SpO2 98%   BMI 25.05 kg/m      General:  pleasant sitting in chair.  Son at bedside and rigo on phone. R sided deficts  HEENT: NC/AT, PER and anicteric, non-injected, oral mucous membranes moist.   Lungs: non-labored, no cough,  no SOB  ABD:  soft, non-tender  Skin: warm and dry, no obvious lesions  Feet:  CMS intact  MSK:  fluid movement of all extremities  Lymp:  no LE edema   Mental status:  alert, oriented x3, unable to communicate verbally - nods and smiles  Psych:  calm, appropriate mood, approrpriate affect    Laboratory  Recent Labs   Lab Test 09/18/21  0728 09/18/21  0625 09/17/21  0814 09/17/21  0741   NA  --  141  --  140   POTASSIUM  --  3.0*  --  3.0*   CHLORIDE  --  108  --  107   CO2  -- "  28  --  29   ANIONGAP  --  5  --  4   GLC 92 94   < > 171*   BUN  --  19  --  15   CR  --  1.37*  --  1.31*   ARLETH  --  9.2  --  8.7    < > = values in this interval not displayed.     CBC RESULTS:   Recent Labs   Lab Test 09/18/21  0625   WBC 8.6   RBC 4.93   HGB 13.2*   HCT 41.1   MCV 83   MCH 26.8   MCHC 32.1   RDW 13.2          Liver Function Studies -   Recent Labs   Lab Test 09/15/21  0521   PROTTOTAL 6.6*   ALBUMIN 2.6*   BILITOTAL 0.3   ALKPHOS 80   AST 20   ALT 22       Active Medications  Current Facility-Administered Medications   Medication     acetaminophen (TYLENOL) tablet 325-975 mg     amLODIPine (NORVASC) tablet 10 mg     aspirin (ASA) chewable tablet 81 mg     atenolol (TENORMIN) tablet 25 mg     atorvastatin (LIPITOR) tablet 80 mg     bisacodyl (DULCOLAX) Suppository 10 mg     clopidogrel (PLAVIX) tablet 75 mg     glucose gel 15-30 g    Or     dextrose 50 % injection 25-50 mL    Or     glucagon injection 1 mg     hydrALAZINE (APRESOLINE) tablet 10 mg     insulin aspart (NovoLOG) injection (RAPID ACTING)     insulin aspart (NovoLOG) injection (RAPID ACTING)     insulin aspart (NovoLOG) injection (RAPID ACTING)     insulin aspart (NovoLOG) injection (RAPID ACTING)     insulin detemir (LEVEMIR PEN) injection 30 Units     melatonin tablet 3 mg     ondansetron (ZOFRAN) tablet 4 mg     pantoprazole (PROTONIX) EC tablet 40 mg     polyethylene glycol (MIRALAX) Packet 17 g     senna-docusate (SENOKOT-S/PERICOLACE) 8.6-50 MG per tablet 1-2 tablet     tamsulosin (FLOMAX) capsule 0.4 mg     No current outpatient medications on file.       Current Diet  Orders Placed This Encounter      Combination Diet Consistent Carb 60 Grams CHO per Meal Diet; Pureed Diet (level 4); Thin Liquids (level 0)        Assessment:    1)  Type 2 Diabetes Mellitus; poor control.  A1c 11.5%  2)  S/p stroke; R hemiparesis  3)  ALEX/CKD    Plan:      -  Levemir/Determir gentle reduction to 28 units daily PM (2200)    -  Novolog 1  unit(s) per 7 g cho TID AC/snacks    -  Novolog high resistance sliding scale insulin TID AC/HS    -  BG monitoring TID AC, HS and 0200    -  Hypoglycemia protocol    -  Carb counting protocol    -  Will follow      FEMI Kohli CNP   Inpatient Diabetes Management Service  Pager - [phone]  Friday - Monday 0800 - 1600 hrs  Diabetes Management Team job code pager for on-call resident after hours of 1600: [phone]     I spent a total of 80 minutes bedside and on the inpatient unit managing glycemic care.  Over 50% of my time on the unit was spent counseling the patient and/or coordinating care regarding acute hyperglycemic management.  See note for details.

## 2022-11-10 NOTE — BRIEF OP NOTE
Brief Postoperative Note      Patient: Angelika Massey  YOB: 1960  MRN: [id number]    Date of Procedure: 11/10/2022    Pre-Op Diagnosis: Multiple thyroid nodules [E04.2]    Post-Op Diagnosis: Same       Procedure(s):  HEMITHYROIDECTOMY    Surgeon(s):  Drea Kenney DO    Assistant:  Surgical Assistant: Kathleen Chandra    Anesthesia: General    Estimated Blood Loss (mL): less than 094     Complications: None    Specimens:   ID Type Source Tests Collected by Time Destination   A : RIGHT THYROID Tissue Tissue SURGICAL PATHOLOGY Drea Kenney DO 11/10/2022 0941        Implants:  * No implants in log *      Drains: * No LDAs found *    Findings: right hemithyroidectomy, recurrent laryngeal nerve stimulated at end of case    Electronically signed by Drea Kenney DO on 11/10/2022 at 10:06 AM

## 2022-11-10 NOTE — DISCHARGE INSTRUCTIONS
[address], [address] [facility]  [phone]    POST-OP Instructions for Neck Operations  Diet  Resume regular diet, as tolerated. You may experience some nausea after surgery for up to 24 hours from the general anesthetic. Take any pain medications with food to avoid upset stomach   Drink plenty of liquids    Activity  Avoid Straining, bending or lifting or vigorous exercise for 1 weeks  Keep the head of your bed elevated to reduce swelling for the first 72 hours  May shower 24 hours after surgery. Let water run over the incision, do not scrub. Pat dry    General Instructions  Drainage from the incision during the first few days is expected. If you have excessive bleeding or green drainage with surrounding redness please call the number above. Medications  Resume your normal medications  Take antibiotics prescribed  Take pain medications as needed for pain  DO NOT take aspirin or aspirin products. NO Advil, Motrin, Aleve, Ibuprofen for two weeks following surgery. DO NOT use any herbal medicines/diet pills for two weeks after surgery. Call the Office  Fever greater than 100.4  Sudden swelling in neck causing difficulty swallowing or breathing is an emergency.     Sudden increase in pain

## 2022-11-10 NOTE — ANESTHESIA PRE PROCEDURE
Department of Anesthesiology  Preprocedure Note       Name:  Sanjuana Babin   Age:  57 y.o.  :  1960                                          MRN:  [id number]         Date:  11/10/2022      Surgeon: Cecil Michel):  Oswaldo Juárez DO    Procedure: Procedure(s):  HEMITHYROIDECTOMY *POSSIBLE 23 HR HOLD PENDING PARATHYROID*    Medications prior to admission:   Prior to Admission medications    Not on File       Current medications:    No current facility-administered medications for this encounter. No current outpatient medications on file. Allergies: Allergies   Allergen Reactions    Penicillins Other (See Comments)     States that she gets an awful infection       Problem List:  There is no problem list on file for this patient. Past Medical History:        Diagnosis Date    Nosebleed        Past Surgical History:        Procedure Laterality Date     SECTION      HYSTERECTOMY (CERVIX STATUS UNKNOWN)      THYROID SURGERY      TONSILLECTOMY         Social History:    Social History     Tobacco Use    Smoking status: Every Day     Packs/day: 0.50     Years: 10.00     Pack years: 5.00     Types: Cigarettes    Smokeless tobacco: Never   Substance Use Topics    Alcohol use: Never                                Ready to quit: Not Answered  Counseling given: Not Answered      Vital Signs (Current):   Vitals:    22 1041   Weight: 160 lb (72.6 kg)   Height: 5' 1\" (1.549 m)                                              BP Readings from Last 3 Encounters:   No data found for BP       NPO Status:                                                                                 BMI:   Wt Readings from Last 3 Encounters:   10/21/22 160 lb (72.6 kg)     Body mass index is 30.23 kg/m².     CBC: No results found for: WBC, RBC, HGB, HCT, MCV, RDW, PLT    CMP: No results found for: NA, K, CL, CO2, BUN, CREATININE, GFRAA, AGRATIO, LABGLOM, GLUCOSE, GLU, PROT, CALCIUM, BILITOT, ALKPHOS, AST, ALT    POC Tests: No results for input(s): POCGLU, POCNA, POCK, POCCL, POCBUN, POCHEMO, POCHCT in the last 72 hours. Coags: No results found for: PROTIME, INR, APTT    HCG (If Applicable): No results found for: PREGTESTUR, PREGSERUM, HCG, HCGQUANT     ABGs: No results found for: PHART, PO2ART, HMC9AUJ, UUX0YKW, BEART, [id number]     Type & Screen (If Applicable):  No results found for: LABABO, LABRH    Drug/Infectious Status (If Applicable):  No results found for: HIV, HEPCAB    COVID-19 Screening (If Applicable): No results found for: COVID19        Anesthesia Evaluation  Patient summary reviewed and Nursing notes reviewed no history of anesthetic complications:   Airway: Mallampati: II  TM distance: >3 FB   Neck ROM: full  Mouth opening: > = 3 FB   Dental: normal exam   (+) caps      Pulmonary:   (+) current smoker                           Cardiovascular:Negative CV ROS                      Neuro/Psych:   Negative Neuro/Psych ROS              GI/Hepatic/Renal: Neg GI/Hepatic/Renal ROS       (-) GERD, liver disease and no renal disease       Endo/Other: Negative Endo/Other ROS       (-) diabetes mellitus               Abdominal:             Vascular: negative vascular ROS. Other Findings:           Anesthesia Plan      general     ASA 2     (I discussed with the patient the risks and benefits of PIV, general anesthesia, IV Narcotics, PACU. All questions were answered the patient agrees with the plan)  Induction: intravenous. MIPS: Prophylactic antiemetics administered. Anesthetic plan and risks discussed with patient. Plan discussed with CRNA.                     Linh Whitman MD   11/10/2022

## 2022-11-10 NOTE — INTERVAL H&P NOTE
Update History & Physical    The patient's History and Physical of October 21, 2022 was reviewed with the patient and I examined the patient. There was no change. The surgical site was confirmed by the patient and me. Plan: The risks, benefits, expected outcome, and alternative to the recommended procedure have been discussed with the patient. Patient understands and wants to proceed with the procedure.      Electronically signed by Thaddeus House DO on 11/10/2022 at 7:12 AM

## 2022-11-14 ENCOUNTER — TELEPHONE (OUTPATIENT)
Dept: ENT CLINIC | Age: 62
End: 2022-11-14

## 2022-11-14 NOTE — TELEPHONE ENCOUNTER
Informed patient of results. Patient was relieved, and said she will be here Friday for her f/u with  [facility].

## 2022-11-14 NOTE — TELEPHONE ENCOUNTER
----- Message from [facility],  sent at 11/11/2022  1:51 PM EST -----  Please let patient know her thyroid was benign- no evidence of malignancy. I will see her next week in office.

## 2022-11-18 ENCOUNTER — OFFICE VISIT (OUTPATIENT)
Dept: ENT CLINIC | Age: 62
End: 2022-11-18

## 2022-11-18 VITALS — BODY MASS INDEX: 30.21 KG/M2 | TEMPERATURE: 98 F | WEIGHT: 160 LBS | RESPIRATION RATE: 16 BRPM | HEIGHT: 61 IN

## 2022-11-18 DIAGNOSIS — E04.2 MULTINODULAR GOITER: Primary | ICD-10-CM

## 2022-11-18 PROCEDURE — 99024 POSTOP FOLLOW-UP VISIT: CPT | Performed by: OTOLARYNGOLOGY

## 2022-11-18 NOTE — PROGRESS NOTES
[address], [address], [address] [place] [address]  P: [phone]       Patient     Jason Morrow  1960    ChiefComplaint     Chief Complaint   Patient presents with    Post-Op Check     Patient states that she is fine, states that she has been having some nausea and heart burn       History of Present Illness     Subha Garcia is a 49-year-old female 1 week status post right hemithyroidectomy doing well. Reports minimal postop pain. Voice and swallowing normal.    Past Medical History     Past Medical History:   Diagnosis Date    Nosebleed        Past Surgical History     Past Surgical History:   Procedure Laterality Date     SECTION      HYSTERECTOMY (CERVIX STATUS UNKNOWN)      THYROID SURGERY      THYROIDECTOMY Right 11/10/2022    HEMITHYROIDECTOMY performed by Diony Terrazas DO at [address] [place] [address]         Family History     History reviewed. No pertinent family history. Social History     Social History     Tobacco Use    Smoking status: Every Day     Packs/day: 0.50     Years: 10.00     Pack years: 5.00     Types: Cigarettes    Smokeless tobacco: Never   Vaping Use    Vaping Use: Never used   Substance Use Topics    Alcohol use: Never    Drug use: Never        Allergies     Allergies   Allergen Reactions    Penicillins Other (See Comments)     States that she gets an awful infection       Medications     Current Outpatient Medications   Medication Sig Dispense Refill    levothyroxine (SYNTHROID) 75 MCG tablet Take 1 tablet by mouth daily 30 tablet 5     No current facility-administered medications for this visit. PhysicalExam     Vitals:    22 1314   Resp: 16   Temp: 98 °F (36.7 °C)   TempSrc: Infrared   Weight: 160 lb (72.6 kg)   Height: 5' 1\" (1.549 m)       Low anterior cervical incision well-healing  Bilateral vocal cords mobile on mirror exam        Assessment and Plan     1.  Multinodular goiter  -Status post completion right hemithyroidectomy, doing well  -On Synthroid 75  -Follow-up 3 to 4 weeks for postop check we will check TSH at that time      Leon Gallegos DO  11/18/22      Portions of this note were dictated using Dragon.  There may be linguistic errors secondary to the use of this program.

## 2022-12-14 ENCOUNTER — OFFICE VISIT (OUTPATIENT)
Dept: ENT CLINIC | Age: 62
End: 2022-12-14

## 2022-12-14 VITALS
TEMPERATURE: 97.1 F | OXYGEN SATURATION: 95 % | WEIGHT: 160 LBS | DIASTOLIC BLOOD PRESSURE: 91 MMHG | SYSTOLIC BLOOD PRESSURE: 149 MMHG | HEART RATE: 96 BPM | HEIGHT: 61 IN | BODY MASS INDEX: 30.21 KG/M2

## 2022-12-14 DIAGNOSIS — E89.0 POSTOPERATIVE HYPOTHYROIDISM: ICD-10-CM

## 2022-12-14 DIAGNOSIS — E04.2 MULTINODULAR GOITER: Primary | ICD-10-CM

## 2022-12-14 PROCEDURE — 99024 POSTOP FOLLOW-UP VISIT: CPT | Performed by: OTOLARYNGOLOGY

## 2022-12-14 NOTE — PROGRESS NOTES
[facility], [address], [address], [address] [place] [address]  P: [phone]       Patient     Issa Andre  1960    ChiefComplaint     Chief Complaint   Patient presents with    Post-Op Check     Patient is here today for her postop checkup from a hemithyroidectomy. Patient states everything is going well. Patient states her skin especially on her legs is extremely dry and itchy. Wondering if Dr. Verona Faust could recommend something. Patient has no questions or concerns from surgery. History of Present Illness     Lion Lara is 1 month status post completion right hemithyroidectomy for multinodular goiter. Reports has been doing well no concerns today. Past Medical History     Past Medical History:   Diagnosis Date    Nosebleed        Past Surgical History     Past Surgical History:   Procedure Laterality Date     SECTION      HYSTERECTOMY (CERVIX STATUS UNKNOWN)      THYROID SURGERY      THYROIDECTOMY Right 11/10/2022    HEMITHYROIDECTOMY performed by Ollie Urbano DO at [place] [address]         Family History     History reviewed. No pertinent family history. Social History     Social History     Tobacco Use    Smoking status: Every Day     Packs/day: 0.50     Years: 10.00     Pack years: 5.00     Types: Cigarettes    Smokeless tobacco: Never   Vaping Use    Vaping Use: Never used   Substance Use Topics    Alcohol use: Never    Drug use: Never        Allergies     Allergies   Allergen Reactions    Penicillins Other (See Comments)     States that she gets an awful infection       Medications     Current Outpatient Medications   Medication Sig Dispense Refill    levothyroxine (SYNTHROID) 75 MCG tablet Take 1 tablet by mouth daily 30 tablet 5     No current facility-administered medications for this visit.        PhysicalExam     Vitals:    22 1254   BP: (!) 149/91   Site: Right Wrist   Position: Sitting   Pulse: 96   Temp: 97.1 °F (36.2 °C)   TempSrc: Infrared   SpO2: 95%   Weight: 160 lb (72.6 kg)   Height: 5' 1\" (1.549 m)     Low anterior cervical incision well-healed      Assessment and Plan     1. Multinodular goiter    2. Postoperative hypothyroidism    - T4, Free; Future  - TSH; Future      Status post completion right hemithyroidectomy 11/10/2022. Plan to check blood work to see if Synthroid needs adjusted. Filiberto Reed, DO  12/14/22      Portions of this note were dictated using Dragon.  There may be linguistic errors secondary to the use of this program.

## 2023-01-04 ENCOUNTER — TELEPHONE (OUTPATIENT)
Dept: ENT CLINIC | Age: 63
End: 2023-01-04

## 2023-01-04 DIAGNOSIS — E89.0 POSTOPERATIVE HYPOTHYROIDISM: Primary | ICD-10-CM

## 2023-01-04 RX ORDER — LEVOTHYROXINE SODIUM 0.1 MG/1
100 TABLET ORAL DAILY
Qty: 30 TABLET | Refills: 0 | Status: SHIPPED | OUTPATIENT
Start: 2023-01-04

## 2023-01-04 NOTE — TELEPHONE ENCOUNTER
Spoke with the patient and informed of Dr. Mitch Eaton response. Patient voiced an understanding and will call back if she has any further questions or concerns.

## 2023-01-04 NOTE — TELEPHONE ENCOUNTER
See attached Thyroid labs     PT called for results this morning.  We requested from [address].

## 2023-01-19 ENCOUNTER — TELEPHONE (OUTPATIENT)
Dept: ENT CLINIC | Age: 63
End: 2023-01-19

## 2023-01-19 DIAGNOSIS — E89.0 POSTOPERATIVE HYPOTHYROIDISM: ICD-10-CM

## 2023-01-19 RX ORDER — LEVOTHYROXINE SODIUM 0.1 MG/1
100 TABLET ORAL DAILY
Qty: 30 TABLET | Refills: 0 | Status: SHIPPED | OUTPATIENT
Start: 2023-01-19

## 2023-01-19 NOTE — TELEPHONE ENCOUNTER
levothyroxine (SYNTHROID) 100 MCG tablet       [id number] [place], [place] - [address] [facility] [address]       Patient called and stated she would be out of medication before we received labs back and needs some for the gap

## 2023-02-01 ENCOUNTER — TELEPHONE (OUTPATIENT)
Dept: ENT CLINIC | Age: 63
End: 2023-02-01

## 2023-02-01 NOTE — TELEPHONE ENCOUNTER
Patient called in asking for her next thyroid level orders to be placed. Informed patient I would send a message to Dr. Maxine Sosa and see if we could place those orders for her. Patient asked for the orders to be sent to [facility]. Informed patient I would see if we could get these orders placed and our office would call her once the orders are placed so she could get them completed. Routing to Dr. Maxine oSsa, orders pended, please advise.

## 2023-02-01 NOTE — TELEPHONE ENCOUNTER
Call placed to patient informed her of the message from Dr. Maxine Sosa. Patient requested I send the orders that were placed on 1/4/23 to [facility].     Call placed to [facility] lab to verify the fax number to send the orders too. Lab fax number is [phone].

## 2023-02-10 ENCOUNTER — TELEPHONE (OUTPATIENT)
Dept: ENT CLINIC | Age: 63
End: 2023-02-10

## 2023-02-10 NOTE — TELEPHONE ENCOUNTER
Patient returned call regarding results    Results relayed to patient. Patient requests that a 3 month supply of the Synthroid 100mcg be sent to her pharmacy on file.      [facility] [address] [place] [address] Jeannine Garcia [facility]

## 2023-02-11 DIAGNOSIS — E89.0 POSTOPERATIVE HYPOTHYROIDISM: ICD-10-CM

## 2023-02-13 RX ORDER — LEVOTHYROXINE SODIUM 0.1 MG/1
TABLET ORAL
Qty: 90 TABLET | Refills: 3 | Status: SHIPPED | OUTPATIENT
Start: 2023-02-13

## 2023-10-19 ENCOUNTER — TELEPHONE (OUTPATIENT)
Dept: ENT CLINIC | Age: 63
End: 2023-10-19

## 2023-10-19 NOTE — TELEPHONE ENCOUNTER
Patient has an appointment scheduled for 10/24/2023. She had her most resent blood work sent for review for visit. Results scanned and attached.

## 2023-10-24 ENCOUNTER — OFFICE VISIT (OUTPATIENT)
Dept: ENT CLINIC | Age: 63
End: 2023-10-24
Payer: MEDICARE

## 2023-10-24 VITALS
SYSTOLIC BLOOD PRESSURE: 136 MMHG | DIASTOLIC BLOOD PRESSURE: 83 MMHG | WEIGHT: 160 LBS | HEART RATE: 92 BPM | RESPIRATION RATE: 16 BRPM | HEIGHT: 61 IN | BODY MASS INDEX: 30.21 KG/M2

## 2023-10-24 DIAGNOSIS — E89.0 POSTOPERATIVE HYPOTHYROIDISM: Primary | ICD-10-CM

## 2023-10-24 PROCEDURE — 1123F ACP DISCUSS/DSCN MKR DOCD: CPT | Performed by: OTOLARYNGOLOGY

## 2023-10-24 PROCEDURE — G8427 DOCREV CUR MEDS BY ELIG CLIN: HCPCS | Performed by: OTOLARYNGOLOGY

## 2023-10-24 PROCEDURE — G8417 CALC BMI ABV UP PARAM F/U: HCPCS | Performed by: OTOLARYNGOLOGY

## 2023-10-24 PROCEDURE — 99213 OFFICE O/P EST LOW 20 MIN: CPT | Performed by: OTOLARYNGOLOGY

## 2023-10-24 PROCEDURE — G8484 FLU IMMUNIZE NO ADMIN: HCPCS | Performed by: OTOLARYNGOLOGY

## 2023-10-24 PROCEDURE — 1090F PRES/ABSN URINE INCON ASSESS: CPT | Performed by: OTOLARYNGOLOGY

## 2023-10-24 PROCEDURE — 4004F PT TOBACCO SCREEN RCVD TLK: CPT | Performed by: OTOLARYNGOLOGY

## 2023-10-24 PROCEDURE — 3017F COLORECTAL CA SCREEN DOC REV: CPT | Performed by: OTOLARYNGOLOGY

## 2023-10-24 PROCEDURE — G8400 PT W/DXA NO RESULTS DOC: HCPCS | Performed by: OTOLARYNGOLOGY

## 2023-10-24 RX ORDER — LEVOTHYROXINE SODIUM 100 MCG
100 TABLET ORAL DAILY
Qty: 30 TABLET | Refills: 3 | Status: SHIPPED | OUTPATIENT
Start: 2023-10-24

## 2023-10-24 NOTE — PROGRESS NOTES
[facility], [address] [place] [address], [address] [place] [address],[address]  P: [phone]       Patient     Viola Varghese  1960    ChiefComplaint     Chief Complaint   Patient presents with    Other     Patient states she is currently  taking thyroid medication and  Patient is having a reaction from the medicine  red spots over body, her fingers are swelling with blisters. History of Present Illness     Jaimie Read is a 68-year-old female here today for follow-up regarding postoperative hypothyroidism. Was doing well on levothyroxine subsequently developed reaction with significant blistering and swelling. She had similar reaction to a medication gabapentin after being on it for prolonged period of time. PCP changed her over to [facility] which improved symptoms for 1 week but they have since returned. Is interested in alternative option. Past Medical History     Past Medical History:   Diagnosis Date    Nosebleed        Past Surgical History     Past Surgical History:   Procedure Laterality Date     SECTION      HYSTERECTOMY (CERVIX STATUS UNKNOWN)      THYROID SURGERY      THYROIDECTOMY Right 11/10/2022    HEMITHYROIDECTOMY performed by Gus Zendejas DO at  [facility]         Family History     History reviewed. No pertinent family history.     Social History     Social History     Tobacco Use    Smoking status: Every Day     Packs/day: 0.50     Years: 10.00     Additional pack years: 0.00     Total pack years: 5.00     Types: Cigarettes    Smokeless tobacco: Never   Vaping Use    Vaping Use: Never used   Substance Use Topics    Alcohol use: Never    Drug use: Never        Allergies     Allergies   Allergen Reactions    Penicillins Other (See Comments)     States that she gets an awful infection       Medications     Current Outpatient Medications   Medication Sig Dispense Refill    SYNTHROID 100 MCG tablet Take 1 tablet by mouth Daily 30 tablet 3     No current

## 2023-10-26 ASSESSMENT — ENCOUNTER SYMPTOMS
COLOR CHANGE: 1
SHORTNESS OF BREATH: 0
FACIAL SWELLING: 0
COUGH: 0
EYE ITCHING: 0
SORE THROAT: 0
APNEA: 0
VOICE CHANGE: 0
SINUS PRESSURE: 0
TROUBLE SWALLOWING: 0

## 2023-11-03 ENCOUNTER — TELEPHONE (OUTPATIENT)
Dept: ENT CLINIC | Age: 63
End: 2023-11-03

## 2024-01-17 ENCOUNTER — TELEPHONE (OUTPATIENT)
Dept: ENT CLINIC | Age: 64
End: 2024-01-17

## 2024-01-17 DIAGNOSIS — E89.0 POSTOPERATIVE HYPOTHYROIDISM: ICD-10-CM

## 2024-01-17 RX ORDER — LEVOTHYROXINE SODIUM 100 MCG
100 TABLET ORAL DAILY
Qty: 30 TABLET | Refills: 11 | Status: SHIPPED | OUTPATIENT
Start: 2024-01-17

## 2024-01-17 NOTE — TELEPHONE ENCOUNTER
Patient calling to ask  for a 2 month supply refill of SYNTHROID 100 MCG tablet     Patient not able to have appointment with Endocrinology until February and wants to make sure she will have enough medication

## 2024-02-21 ENCOUNTER — OFFICE VISIT (OUTPATIENT)
Dept: ENDOCRINOLOGY | Age: 64
End: 2024-02-21

## 2024-02-21 VITALS
SYSTOLIC BLOOD PRESSURE: 133 MMHG | RESPIRATION RATE: 16 BRPM | BODY MASS INDEX: 32.32 KG/M2 | HEIGHT: 61 IN | WEIGHT: 171.2 LBS | HEART RATE: 81 BPM | DIASTOLIC BLOOD PRESSURE: 79 MMHG

## 2024-02-21 DIAGNOSIS — L68.0 HIRSUTISM: ICD-10-CM

## 2024-02-21 DIAGNOSIS — L85.3 DRY SKIN: ICD-10-CM

## 2024-02-21 DIAGNOSIS — E03.9 HYPOTHYROIDISM (ACQUIRED): Primary | ICD-10-CM

## 2024-02-21 PROCEDURE — 99204 OFFICE O/P NEW MOD 45 MIN: CPT | Performed by: INTERNAL MEDICINE

## 2024-02-21 PROCEDURE — 1123F ACP DISCUSS/DSCN MKR DOCD: CPT | Performed by: INTERNAL MEDICINE

## 2024-02-21 RX ORDER — LEVOTHYROXINE SODIUM 112 UG/1
112 TABLET ORAL DAILY
Qty: 30 TABLET | Refills: 12 | Status: SHIPPED | OUTPATIENT
Start: 2024-02-21 | End: 2025-02-20

## 2024-02-21 NOTE — PROGRESS NOTES
[facility] Endocrinology        Chief Complaint   Patient presents with    New Patient     hypothyroidism       Nathalie Austin is a pleasant 63 y.o. year old female here to establish care for postsurgical hypothyroidism.    She reported that at age 21, she had left lobectomy due to goiter.  She did not require levothyroxine therapy after her first surgery. In 2022, she noticed further enlargement of right side of the neck.  Ultrasound at that time showed 3 large right thyroid nodules.  Subsequently she underwent right lobectomy in October 2022, no evidence of malignancy.    She subsequently started on generic levothyroxine and had an allergic reaction with skin rash.  Same reaction happened with [place] [facility].  She was subsequently started on Synthroid brand name, currently on 100 mcg daily.  She denies any recent rashes.  She though reports dry skin and changes to her nails.  She also reports her loss on scalp.  She also reports excess hair on chin which had been a problem for many years.  She reports having difficulty with hormonal imbalances years ago.  She denies any recent changes in hand or shoe size.  She would like to avoid the addition of further medications.  She only takes Synthroid at this time, typically on an empty stomach in the morning.  She hold off on eating for about 45 minutes.      Most recent labs from 2/8/2023 showed a TSH of 4.1, free T4 of 1.0.      She smokes about 1 pack/day for the past 10 years.  No alcohol and no illicit drug use.  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  She lives alone.  She has family history of thyroid cancer in mother as well as breast cancer.      ALLERGIES:  Allergies   Allergen Reactions    Penicillins Other (See Comments)     States that she gets an awful infection    Levothyroxine Hives        MEDICATIONS:  Outpatient Medications Marked as Taking for the 2/21/24 encounter (Office Visit) with Mallory Manzanares MD   Medication Sig Dispense Refill    levothyroxine (SYNTHROID)

## 2024-02-22 LAB
ALBUMIN SERPL-MCNC: 4 G/DL (ref 3.4–5)
ANION GAP SERPL CALCULATED.3IONS-SCNC: 11 MMOL/L (ref 3–16)
BUN SERPL-MCNC: 18 MG/DL (ref 7–20)
CALCIUM SERPL-MCNC: 9.5 MG/DL (ref 8.3–10.6)
CHLORIDE SERPL-SCNC: 97 MMOL/L (ref 99–110)
CO2 SERPL-SCNC: 26 MMOL/L (ref 21–32)
CREAT SERPL-MCNC: 0.6 MG/DL (ref 0.6–1.2)
GFR SERPLBLD CREATININE-BSD FMLA CKD-EPI: >60 ML/MIN/{1.73_M2}
GLUCOSE SERPL-MCNC: 283 MG/DL (ref 70–99)
PHOSPHATE SERPL-MCNC: 3.2 MG/DL (ref 2.5–4.9)
POTASSIUM SERPL-SCNC: 4.6 MMOL/L (ref 3.5–5.1)
SODIUM SERPL-SCNC: 134 MMOL/L (ref 136–145)

## 2024-02-23 LAB — DHEA-S SERPL-MCNC: 79 UG/DL (ref 9.4–246)

## 2024-02-27 DIAGNOSIS — E11.65 TYPE 2 DIABETES MELLITUS WITH HYPERGLYCEMIA, WITHOUT LONG-TERM CURRENT USE OF INSULIN (HCC): Primary | ICD-10-CM

## 2024-02-28 ENCOUNTER — TELEPHONE (OUTPATIENT)
Dept: ENDOCRINOLOGY | Age: 64
End: 2024-02-28

## 2024-03-27 ENCOUNTER — TELEPHONE (OUTPATIENT)
Dept: ENDOCRINOLOGY | Age: 64
End: 2024-03-27

## 2024-03-27 DIAGNOSIS — E03.9 HYPOTHYROIDISM (ACQUIRED): ICD-10-CM

## 2024-03-27 RX ORDER — LEVOTHYROXINE SODIUM 112 UG/1
112 TABLET ORAL DAILY
Qty: 90 TABLET | Refills: 1 | Status: SHIPPED | OUTPATIENT
Start: 2024-03-27 | End: 2025-03-27

## 2024-03-27 NOTE — TELEPHONE ENCOUNTER
Problem: Patient Care Overview (Adult)  Goal: Plan of Care Review  Outcome: Ongoing (interventions implemented as appropriate)  Patient tolerated treatment well.  No reaction suspected.  AVS given to patient. No questions or concerns.  Patient ambulated off unit unassisted.        Pt called in and would like a 90 day supply of levothyroxine (SYNTHROID) 112 MCG tablet  to cut down cost    [facility] rx  [address] [address]  Ph: [phone]  Fax: [phone]

## 2024-08-07 ENCOUNTER — TELEPHONE (OUTPATIENT)
Dept: ENDOCRINOLOGY | Age: 64
End: 2024-08-07

## 2024-08-07 DIAGNOSIS — E03.9 HYPOTHYROIDISM (ACQUIRED): ICD-10-CM

## 2024-08-07 RX ORDER — LEVOTHYROXINE SODIUM 112 UG/1
112 TABLET ORAL DAILY
Qty: 30 TABLET | Refills: 1 | Status: SHIPPED | OUTPATIENT
Start: 2024-08-07 | End: 2025-08-07

## 2024-08-07 NOTE — TELEPHONE ENCOUNTER
Pt calling and requesting refill for Levothyroxine 112mcg. Pt states she prob just needs a 1 month supply just in case she needs a dosage change and not a 3 month supply    Pharmacy [facility] on [facility]# [phone]

## 2024-08-28 ENCOUNTER — OFFICE VISIT (OUTPATIENT)
Dept: ENDOCRINOLOGY | Age: 64
End: 2024-08-28

## 2024-08-28 VITALS
HEIGHT: 61 IN | BODY MASS INDEX: 30.58 KG/M2 | SYSTOLIC BLOOD PRESSURE: 141 MMHG | HEART RATE: 86 BPM | DIASTOLIC BLOOD PRESSURE: 73 MMHG | WEIGHT: 162 LBS

## 2024-08-28 DIAGNOSIS — L73.2 HIDRADENITIS SUPPURATIVA: ICD-10-CM

## 2024-08-28 DIAGNOSIS — E03.9 HYPOTHYROIDISM (ACQUIRED): Primary | ICD-10-CM

## 2024-08-28 DIAGNOSIS — E03.9 HYPOTHYROIDISM (ACQUIRED): ICD-10-CM

## 2024-08-28 DIAGNOSIS — E11.65 UNCONTROLLED TYPE 2 DIABETES MELLITUS WITH HYPERGLYCEMIA, WITHOUT LONG-TERM CURRENT USE OF INSULIN (HCC): ICD-10-CM

## 2024-08-28 PROCEDURE — 99214 OFFICE O/P EST MOD 30 MIN: CPT | Performed by: INTERNAL MEDICINE

## 2024-08-28 PROCEDURE — 1123F ACP DISCUSS/DSCN MKR DOCD: CPT | Performed by: INTERNAL MEDICINE

## 2024-08-28 NOTE — PROGRESS NOTES
[facility] Endocrinology        Chief Complaint   Patient presents with    Thyroid Problem    Follow-up       Nathalie Austin is a pleasant 64 y.o. year old female here for follow-up for postsurgical hypothyroidism.  She also has type 2 diabetes managed by PCP.    She reported that at age 21, she had left lobectomy due to goiter.  She did not require levothyroxine therapy after her first surgery. In 2022, she noticed further enlargement of right side of the neck.  Ultrasound at that time showed 3 large right thyroid nodules.  Subsequently she underwent right lobectomy in October 2022, no evidence of malignancy.    She subsequently started on generic levothyroxine and had an allergic reaction with skin rash.  Same reaction happened with [place] [facility].  She was subsequently started on Synthroid brand name, currently on 112 mcg daily.  She denies any recent rashes.  No palpitations or tremors. She also reports excess hair on chin which had been a problem for many years.  She reports having difficulty with hormonal imbalances years ago and infertility.  She denies any recent changes in hand or shoe size.  She would like to avoid the addition of further medications.  She only takes Synthroid at this time, typically on an empty stomach in the morning.  She hold off on eating for about 45 minutes.      Most recent labs from 2/8/2023 showed a TSH of 4.1, free T4 of 1.0.  Due to update labs.  Further evaluation February 2024 showed testosterone 22, DHEA-S of 79, sodium 143, glucose 283, of note [id number] from November 2022.    She smokes about 1 pack/day for the past 10 years.  No alcohol and no illicit drug use.  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  She lives alone but plans to sell her house and moved in with her son who lives close to her.  She also plans to retire soon to lower her stress level and work with her son.  She has family history of thyroid cancer in mother as well as breast cancer.      ALLERGIES:  Allergies

## 2024-08-29 LAB
T4 FREE SERPL-MCNC: 1.2 NG/DL (ref 0.9–1.8)
TSH SERPL DL<=0.005 MIU/L-ACNC: 2.54 UIU/ML (ref 0.27–4.2)

## 2024-09-04 ENCOUNTER — TELEPHONE (OUTPATIENT)
Dept: ENDOCRINOLOGY | Age: 64
End: 2024-09-04

## 2024-09-04 DIAGNOSIS — E03.9 HYPOTHYROIDISM (ACQUIRED): ICD-10-CM

## 2024-09-04 RX ORDER — LEVOTHYROXINE SODIUM 112 UG/1
112 TABLET ORAL DAILY
Qty: 90 TABLET | Refills: 1 | Status: SHIPPED | OUTPATIENT
Start: 2024-09-04 | End: 2025-09-04

## 2024-09-04 NOTE — TELEPHONE ENCOUNTER
PT called in needing a three month supply of the levothyroxine not just a one month.  Can you please reach out to the pt and or pharmacy and get this refill sent in?    [facility] - [place], [place] - [address] [facility] - P [phone] - F [phone]  [address] [facility] [address]  Phone: [phone]  Fax: [phone]

## 2025-02-05 LAB
T4 FREE: 1.19 NG/DL (ref 0.89–1.76)
TSH ULTRASENSITIVE: 2.99 MCIU/ML (ref 0.55–4.78)

## 2025-02-06 ENCOUNTER — TELEPHONE (OUTPATIENT)
Dept: ENDOCRINOLOGY | Age: 65
End: 2025-02-06

## 2025-02-06 NOTE — TELEPHONE ENCOUNTER
Patient advised.  Patient states she does not think she has enough medication to make it until f/u in July and she doesn't remember when she picked the refill up.    Patient will call once she gets home to let us know when she picked medication up and how much she has left.

## 2025-02-06 NOTE — TELEPHONE ENCOUNTER
----- Message from Dr. Mallory Manzanares MD sent at 2/5/2025  3:23 PM EST -----  Please let patient that her labs came back showing normal thyroid function.  Would recommend to continue current dose of Synthroid.  Repeat labs before next visit in August.

## 2025-06-25 ENCOUNTER — TELEPHONE (OUTPATIENT)
Dept: ENDOCRINOLOGY | Age: 65
End: 2025-06-25

## 2025-06-25 DIAGNOSIS — E03.9 HYPOTHYROIDISM (ACQUIRED): Primary | ICD-10-CM

## 2025-07-10 LAB
T4 FREE: 1.28 NG/DL (ref 0.89–1.76)
TSH ULTRASENSITIVE: 2.35 MCIU/ML (ref 0.55–4.78)

## 2025-07-16 ENCOUNTER — OFFICE VISIT (OUTPATIENT)
Dept: ENDOCRINOLOGY | Age: 65
End: 2025-07-16
Payer: MEDICARE

## 2025-07-16 ENCOUNTER — HOSPITAL ENCOUNTER (OUTPATIENT)
Dept: ULTRASOUND IMAGING | Age: 65
Discharge: HOME | End: 2025-07-16
Attending: INTERNAL MEDICINE
Payer: MEDICARE

## 2025-07-16 VITALS
SYSTOLIC BLOOD PRESSURE: 137 MMHG | WEIGHT: 159 LBS | BODY MASS INDEX: 30.02 KG/M2 | DIASTOLIC BLOOD PRESSURE: 82 MMHG | HEIGHT: 61 IN | HEART RATE: 81 BPM

## 2025-07-16 DIAGNOSIS — Z98.890 H/O THYROIDECTOMY: ICD-10-CM

## 2025-07-16 DIAGNOSIS — Z90.89 H/O THYROIDECTOMY: ICD-10-CM

## 2025-07-16 DIAGNOSIS — E03.9 HYPOTHYROIDISM (ACQUIRED): ICD-10-CM

## 2025-07-16 DIAGNOSIS — R09.A2 GLOBUS SENSATION: ICD-10-CM

## 2025-07-16 DIAGNOSIS — R09.A2 GLOBUS SENSATION: Primary | ICD-10-CM

## 2025-07-16 PROCEDURE — 1123F ACP DISCUSS/DSCN MKR DOCD: CPT | Performed by: INTERNAL MEDICINE

## 2025-07-16 PROCEDURE — 1159F MED LIST DOCD IN RCRD: CPT | Performed by: INTERNAL MEDICINE

## 2025-07-16 PROCEDURE — 99214 OFFICE O/P EST MOD 30 MIN: CPT | Performed by: INTERNAL MEDICINE

## 2025-07-16 PROCEDURE — 76536 US EXAM OF HEAD AND NECK: CPT

## 2025-07-16 PROCEDURE — G2211 COMPLEX E/M VISIT ADD ON: HCPCS | Performed by: INTERNAL MEDICINE

## 2025-07-16 RX ORDER — LEVOTHYROXINE SODIUM 112 UG/1
112 TABLET ORAL DAILY
Qty: 90 TABLET | Refills: 3 | Status: SHIPPED | OUTPATIENT
Start: 2025-07-16 | End: 2026-07-16

## 2025-07-16 RX ORDER — FEXOFENADINE HCL 180 MG/1
180 TABLET ORAL DAILY
COMMUNITY
Start: 2025-03-05

## 2025-07-16 NOTE — PROGRESS NOTES
[facility] Endocrinology        Chief Complaint   Patient presents with    Hypothyroidism    Follow-up       Nathalie Austin is a pleasant 64 y.o. year old female here for follow-up for postsurgical hypothyroidism. She also has type 2 diabetes managed by PCP.    She reported that at age 21, she had left lobectomy due to goiter.  She did not require levothyroxine therapy after her first surgery. In 2022, she noticed further enlargement of right side of the neck.  Ultrasound at that time showed 3 large right thyroid nodules.  Subsequently she underwent right lobectomy in October 2022, no evidence of malignancy.    She subsequently started on generic levothyroxine and had an allergic reaction with skin rash.  Same reaction happened with [place] [facility].  She was subsequently started on Synthroid brand name, currently on 112 mcg daily.  She denies any recent rashes.  No palpitations or tremors. She also reports excess hair on chin which had been a problem for many years.  She reports having difficulty with hormonal imbalances years ago and infertility.  She denies any recent changes in hand or shoe size.  She would like to avoid the addition of further medications.  She only takes Synthroid at this time, typically on an empty stomach in the morning.  She hold off on eating for about 45 minutes.  She reports globus sensation.      Further evaluation February 2024 showed testosterone 22, DHEA-S of 79, sodium 143, glucose 283, of note [id number] from November 2022.    Lab Results   Component Value Date    TSH 2.347 07/10/2025    T4FREE 1.28 07/10/2025       She smokes about 1 pack/day for the past 10 years.  No alcohol and no illicit drug use.  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 She has family history of thyroid cancer in mother as well as breast cancer.      ALLERGIES:  Allergies   Allergen Reactions    Penicillins Other (See Comments)     States that she gets an awful infection    Levothyroxine Hives

## 2025-07-25 ENCOUNTER — HOSPITAL ENCOUNTER (OUTPATIENT)
Dept: ULTRASOUND IMAGING | Age: 65
Discharge: HOME | End: 2025-07-25
Payer: MEDICARE

## 2025-07-25 VITALS — SYSTOLIC BLOOD PRESSURE: 149 MMHG | DIASTOLIC BLOOD PRESSURE: 71 MMHG | HEART RATE: 67 BPM | OXYGEN SATURATION: 96 %

## 2025-07-25 DIAGNOSIS — R22.1 MASS OF RIGHT SIDE OF NECK: ICD-10-CM

## 2025-07-25 DIAGNOSIS — Z98.890 H/O THYROIDECTOMY: ICD-10-CM

## 2025-07-25 DIAGNOSIS — Z90.89 H/O THYROIDECTOMY: ICD-10-CM

## 2025-07-25 PROCEDURE — 10005 FNA BX W/US GDN 1ST LES: CPT

## 2025-07-25 NOTE — PROCEDURES
PROCEDURE NOTE  Date: 7/25/2025   Name: Nathalie Austin  YOB: 1960    Procedures    Pt arrived for Image Guided right Fine Needle Aspiration of right neck mass . Dr. Llanos explained the procedure including the risk and benefits of the procedure. All questions answered. Pt verbalizes understanding of the procedure and states no more questions. Consent confirmed. Vital signs stable. Labs, allergies, medications, and code status reviewed. No contraindications noted. Time out completed prior to procedure.      Vital Signs  Vitals:    07/25/25 1000   BP: (!) 147/76   Pulse: 70    (vital signs in table format)

## 2025-07-25 NOTE — PROCEDURES
PROCEDURE NOTE  Date: 7/25/2025   Name: Nathalie Austin  YOB: 1960    Procedures    Image Guided right Fine Needle Aspiration completed. 5 passes completed on the right neck mass. Specimens sent with lab who was present for procedure. Pt tolerated procedure without any signs or symptoms of distress. Vital signs stable.      Pt cleared for discharge. Pt denies any pain or pressure at the aspiration procedure site. Aspiration site dressing clean, dry, and intact. Pt verbalizes understanding of the discharge instructions and states no more questions. Pt discharged in stable condition with all belongings.     Vital Signs  Vitals:    07/25/25 1015   BP: (!) 149/71   Pulse: 67   SpO2:     (vital signs in table format)        Home

## 2025-08-21 ENCOUNTER — OFFICE VISIT (OUTPATIENT)
Dept: ENT CLINIC | Age: 65
End: 2025-08-21
Payer: MEDICARE

## 2025-08-21 VITALS
BODY MASS INDEX: 29.45 KG/M2 | DIASTOLIC BLOOD PRESSURE: 77 MMHG | WEIGHT: 156 LBS | HEART RATE: 71 BPM | SYSTOLIC BLOOD PRESSURE: 125 MMHG | HEIGHT: 61 IN

## 2025-08-21 DIAGNOSIS — Z98.890 S/P TOTAL THYROIDECTOMY: ICD-10-CM

## 2025-08-21 DIAGNOSIS — R09.A2 GLOBUS SENSATION: ICD-10-CM

## 2025-08-21 DIAGNOSIS — Z90.89 S/P TOTAL THYROIDECTOMY: ICD-10-CM

## 2025-08-21 DIAGNOSIS — R22.1 NECK MASS: Primary | ICD-10-CM

## 2025-08-21 PROCEDURE — 1159F MED LIST DOCD IN RCRD: CPT | Performed by: OTOLARYNGOLOGY

## 2025-08-21 PROCEDURE — 1123F ACP DISCUSS/DSCN MKR DOCD: CPT | Performed by: OTOLARYNGOLOGY

## 2025-08-21 PROCEDURE — 99214 OFFICE O/P EST MOD 30 MIN: CPT | Performed by: OTOLARYNGOLOGY

## 2025-08-21 PROCEDURE — 1160F RVW MEDS BY RX/DR IN RCRD: CPT | Performed by: OTOLARYNGOLOGY

## 2025-08-21 ASSESSMENT — ENCOUNTER SYMPTOMS
TROUBLE SWALLOWING: 0
EYE ITCHING: 0
COUGH: 0
SHORTNESS OF BREATH: 0
VOICE CHANGE: 0
APNEA: 0
SORE THROAT: 0
FACIAL SWELLING: 0
SINUS PRESSURE: 0

## 2025-08-26 ENCOUNTER — HOSPITAL ENCOUNTER (OUTPATIENT)
Age: 65
Discharge: HOME | End: 2025-08-26
Attending: OTOLARYNGOLOGY
Payer: MEDICARE

## 2025-08-26 ENCOUNTER — HOSPITAL ENCOUNTER (OUTPATIENT)
Dept: CT IMAGING | Age: 65
Discharge: HOME | End: 2025-08-26
Attending: OTOLARYNGOLOGY
Payer: MEDICARE

## 2025-08-26 DIAGNOSIS — R22.1 NECK MASS: ICD-10-CM

## 2025-08-26 LAB
CREAT SERPL-MCNC: <0.5 MG/DL (ref 0.6–1.2)
GFR SERPLBLD CREATININE-BSD FMLA CKD-EPI: >90 ML/MIN/{1.73_M2}

## 2025-08-26 PROCEDURE — 6360000004 HC RX CONTRAST MEDICATION: Performed by: OTOLARYNGOLOGY

## 2025-08-26 PROCEDURE — 70491 CT SOFT TISSUE NECK W/DYE: CPT

## 2025-08-26 PROCEDURE — 82565 ASSAY OF CREATININE: CPT

## 2025-08-26 PROCEDURE — 36415 COLL VENOUS BLD VENIPUNCTURE: CPT

## 2025-08-26 RX ORDER — IOPAMIDOL 755 MG/ML
75 INJECTION, SOLUTION INTRAVASCULAR
Status: COMPLETED | OUTPATIENT
Start: 2025-08-26 | End: 2025-08-26

## 2025-08-26 RX ADMIN — IOPAMIDOL 75 ML: 755 INJECTION, SOLUTION INTRAVENOUS at 09:18

## 2025-08-28 ENCOUNTER — RESULTS FOLLOW-UP (OUTPATIENT)
Dept: ENT CLINIC | Age: 65
End: 2025-08-28

## 2025-09-04 ENCOUNTER — TELEPHONE (OUTPATIENT)
Dept: ENT CLINIC | Age: 65
End: 2025-09-04

## (undated) DEVICE — SYRINGE MED 10ML TRNSLUC BRL PLUNG BLK MRK POLYPR CTRL

## (undated) DEVICE — MAJOR SET UP PK

## (undated) DEVICE — SUTURE MCRYL SZ 4 0 L18IN ABSRB UD P 3 3 8 CIR REV CUT NDL MCP494G

## (undated) DEVICE — SPONGE SURG WHT KTNR DISECT RADPQ ST

## (undated) DEVICE — CABLE BPLR L12FT FLYING LD DISPOSABLE

## (undated) DEVICE — SKIN MARKER,REGULAR TIP WITH RULER CAP: Brand: DEVON

## (undated) DEVICE — AGENT HEMSTAT W2XL4IN OXIDIZED REGENERATED CELOS ABSRB

## (undated) DEVICE — BLADE ES ELASTOMERIC COAT INSUL DURABLE BEND UPTO 90DEG

## (undated) DEVICE — APPLICATOR MEDICATED 26 CC SOLUTION HI LT ORNG CHLORAPREP

## (undated) DEVICE — SUTURE VCRL + SZ 3-0 L18IN ABSRB UD SH 1/2 CIR TAPERCUT NDL VCP864D

## (undated) DEVICE — SHEARS ENDOSCP L9CM CRV HARM FOCS +

## (undated) DEVICE — GLOVE SURG SZ 6 THK91MIL LTX FREE SYN POLYISOPRENE ANTI

## (undated) DEVICE — SURGICAL PROCEDURE PACK IV U-BAR